# Patient Record
Sex: FEMALE | Race: WHITE | Employment: UNEMPLOYED | URBAN - METROPOLITAN AREA
[De-identification: names, ages, dates, MRNs, and addresses within clinical notes are randomized per-mention and may not be internally consistent; named-entity substitution may affect disease eponyms.]

---

## 2017-02-20 PROBLEM — Z34.90 PREGNANCY: Status: ACTIVE | Noted: 2017-02-20

## 2017-07-05 DIAGNOSIS — Z3A.30 30 WEEKS GESTATION OF PREGNANCY: ICD-10-CM

## 2017-07-05 LAB
GLUCOSE, 1HR PP: 141 MG/DL (ref 60–140)
HCT VFR BLD CALC: 40.9 % (ref 37–47)
HEMOGLOBIN: 13.6 G/DL (ref 12–16)

## 2017-07-12 DIAGNOSIS — Z34.90 PREGNANCY, UNSPECIFIED GESTATIONAL AGE: ICD-10-CM

## 2017-07-12 LAB
GLUCOSE FASTING: 81 MG/DL (ref 0–95)
GLUCOSE TOLERANCE TEST 1 HOUR: 133 MG/DL (ref 0–180)
GLUCOSE TOLERANCE TEST 2 HOUR: 101 MG/DL (ref 0–155)
GLUCOSE TOLERANCE TEST 3 HOUR: 82 MG/DL (ref 0–140)

## 2017-08-23 DIAGNOSIS — Z3A.35 35 WEEKS GESTATION OF PREGNANCY: ICD-10-CM

## 2017-08-25 LAB
GENITAL CULTURE, ROUTINE: ABNORMAL
GENITAL CULTURE, ROUTINE: ABNORMAL
ORGANISM: ABNORMAL

## 2017-09-05 ENCOUNTER — HOSPITAL ENCOUNTER (OUTPATIENT)
Age: 42
Discharge: HOME | End: 2017-09-05
Attending: OBSTETRICS & GYNECOLOGY | Admitting: OBSTETRICS & GYNECOLOGY
Payer: COMMERCIAL

## 2017-09-05 VITALS
TEMPERATURE: 98.1 F | SYSTOLIC BLOOD PRESSURE: 121 MMHG | RESPIRATION RATE: 18 BRPM | HEART RATE: 81 BPM | DIASTOLIC BLOOD PRESSURE: 73 MMHG

## 2017-09-05 PROCEDURE — 59025 FETAL NON-STRESS TEST: CPT

## 2017-09-18 ENCOUNTER — ANESTHESIA EVENT (OUTPATIENT)
Dept: LABOR AND DELIVERY | Age: 42
DRG: 540 | End: 2017-09-18
Payer: COMMERCIAL

## 2017-09-18 ENCOUNTER — HOSPITAL ENCOUNTER (INPATIENT)
Age: 42
LOS: 2 days | Discharge: HOME | DRG: 540 | End: 2017-09-20
Attending: OBSTETRICS & GYNECOLOGY | Admitting: OBSTETRICS & GYNECOLOGY
Payer: COMMERCIAL

## 2017-09-18 ENCOUNTER — ANESTHESIA (OUTPATIENT)
Dept: LABOR AND DELIVERY | Age: 42
DRG: 540 | End: 2017-09-18
Payer: COMMERCIAL

## 2017-09-18 VITALS — DIASTOLIC BLOOD PRESSURE: 57 MMHG | SYSTOLIC BLOOD PRESSURE: 108 MMHG | OXYGEN SATURATION: 97 %

## 2017-09-18 LAB
ABO/RH: NORMAL
ALBUMIN SERPL-MCNC: 3.3 G/DL (ref 3.9–4.9)
ALP BLD-CCNC: 125 U/L (ref 40–130)
ALT SERPL-CCNC: 15 U/L (ref 0–33)
AMPHETAMINE SCREEN, URINE: NORMAL
ANION GAP SERPL CALCULATED.3IONS-SCNC: 16 MEQ/L (ref 7–13)
ANTIBODY SCREEN: NORMAL
APTT: 27.3 SEC (ref 21.6–35.4)
AST SERPL-CCNC: 19 U/L (ref 0–35)
BARBITURATE SCREEN URINE: NORMAL
BASOPHILS ABSOLUTE: 0 K/UL (ref 0–0.2)
BASOPHILS RELATIVE PERCENT: 0.4 %
BENZODIAZEPINE SCREEN, URINE: NORMAL
BILIRUB SERPL-MCNC: 0.4 MG/DL (ref 0–1.2)
BILIRUBIN URINE: NEGATIVE
BLOOD, URINE: NEGATIVE
BUN BLDV-MCNC: 9 MG/DL (ref 6–20)
CALCIUM SERPL-MCNC: 8.5 MG/DL (ref 8.6–10.2)
CANNABINOID SCREEN URINE: NORMAL
CHLORIDE BLD-SCNC: 103 MEQ/L (ref 98–107)
CLARITY: CLEAR
CO2: 19 MEQ/L (ref 22–29)
COCAINE METABOLITE SCREEN URINE: NORMAL
COLOR: YELLOW
CREAT SERPL-MCNC: 0.42 MG/DL (ref 0.5–0.9)
EOSINOPHILS ABSOLUTE: 0 K/UL (ref 0–0.7)
EOSINOPHILS RELATIVE PERCENT: 0.2 %
GFR AFRICAN AMERICAN: >60
GFR NON-AFRICAN AMERICAN: >60
GLOBULIN: 2.7 G/DL (ref 2.3–3.5)
GLUCOSE BLD-MCNC: 80 MG/DL (ref 74–109)
GLUCOSE URINE: NEGATIVE MG/DL
HCT VFR BLD CALC: 43.5 % (ref 37–47)
HEMOGLOBIN: 14.6 G/DL (ref 12–16)
HEPATITIS B SURFACE ANTIGEN INTERPRETATION: NORMAL
INR BLD: 0.9
KETONES, URINE: 15 MG/DL
LEUKOCYTE ESTERASE, URINE: NEGATIVE
LYMPHOCYTES ABSOLUTE: 1.6 K/UL (ref 1–4.8)
LYMPHOCYTES RELATIVE PERCENT: 18.5 %
Lab: NORMAL
MCH RBC QN AUTO: 31.9 PG (ref 27–31.3)
MCHC RBC AUTO-ENTMCNC: 33.5 % (ref 33–37)
MCV RBC AUTO: 95.2 FL (ref 82–100)
MONOCYTES ABSOLUTE: 0.7 K/UL (ref 0.2–0.8)
MONOCYTES RELATIVE PERCENT: 7.9 %
NEUTROPHILS ABSOLUTE: 6.2 K/UL (ref 1.4–6.5)
NEUTROPHILS RELATIVE PERCENT: 73 %
NITRITE, URINE: NEGATIVE
OPIATE SCREEN URINE: NORMAL
PDW BLD-RTO: 13.4 % (ref 11.5–14.5)
PH UA: 6 (ref 5–9)
PHENCYCLIDINE SCREEN URINE: NORMAL
PLATELET # BLD: 147 K/UL (ref 130–400)
POTASSIUM SERPL-SCNC: 4 MEQ/L (ref 3.5–5.1)
PROTEIN UA: ABNORMAL MG/DL
PROTHROMBIN TIME: 9.6 SEC (ref 8.1–13.7)
RBC # BLD: 4.57 M/UL (ref 4.2–5.4)
SODIUM BLD-SCNC: 138 MEQ/L (ref 132–144)
SPECIFIC GRAVITY UA: 1.02 (ref 1–1.03)
TOTAL PROTEIN: 6 G/DL (ref 6.4–8.1)
UROBILINOGEN, URINE: 0.2 E.U./DL
WBC # BLD: 8.5 K/UL (ref 4.8–10.8)

## 2017-09-18 PROCEDURE — 2580000003 HC RX 258: Performed by: OBSTETRICS & GYNECOLOGY

## 2017-09-18 PROCEDURE — 6370000000 HC RX 637 (ALT 250 FOR IP): Performed by: OBSTETRICS & GYNECOLOGY

## 2017-09-18 PROCEDURE — 6360000002 HC RX W HCPCS: Performed by: NURSE ANESTHETIST, CERTIFIED REGISTERED

## 2017-09-18 PROCEDURE — 3700000001 HC ADD 15 MINUTES (ANESTHESIA): Performed by: OBSTETRICS & GYNECOLOGY

## 2017-09-18 PROCEDURE — 6360000002 HC RX W HCPCS: Performed by: OBSTETRICS & GYNECOLOGY

## 2017-09-18 PROCEDURE — 2500000003 HC RX 250 WO HCPCS: Performed by: OBSTETRICS & GYNECOLOGY

## 2017-09-18 PROCEDURE — 87340 HEPATITIS B SURFACE AG IA: CPT

## 2017-09-18 PROCEDURE — 59514 CESAREAN DELIVERY ONLY: CPT | Performed by: OBSTETRICS & GYNECOLOGY

## 2017-09-18 PROCEDURE — 2500000003 HC RX 250 WO HCPCS: Performed by: NURSE ANESTHETIST, CERTIFIED REGISTERED

## 2017-09-18 PROCEDURE — 6360000002 HC RX W HCPCS

## 2017-09-18 PROCEDURE — 80053 COMPREHEN METABOLIC PANEL: CPT

## 2017-09-18 PROCEDURE — 7100000000 HC PACU RECOVERY - FIRST 15 MIN: Performed by: OBSTETRICS & GYNECOLOGY

## 2017-09-18 PROCEDURE — 36415 COLL VENOUS BLD VENIPUNCTURE: CPT

## 2017-09-18 PROCEDURE — 85610 PROTHROMBIN TIME: CPT

## 2017-09-18 PROCEDURE — 86901 BLOOD TYPING SEROLOGIC RH(D): CPT

## 2017-09-18 PROCEDURE — 86850 RBC ANTIBODY SCREEN: CPT

## 2017-09-18 PROCEDURE — 81003 URINALYSIS AUTO W/O SCOPE: CPT

## 2017-09-18 PROCEDURE — 7100000001 HC PACU RECOVERY - ADDTL 15 MIN: Performed by: OBSTETRICS & GYNECOLOGY

## 2017-09-18 PROCEDURE — 85025 COMPLETE CBC W/AUTO DIFF WBC: CPT

## 2017-09-18 PROCEDURE — 86592 SYPHILIS TEST NON-TREP QUAL: CPT

## 2017-09-18 PROCEDURE — 80307 DRUG TEST PRSMV CHEM ANLYZR: CPT

## 2017-09-18 PROCEDURE — 4A1HXCZ MONITORING OF PRODUCTS OF CONCEPTION, CARDIAC RATE, EXTERNAL APPROACH: ICD-10-PCS | Performed by: OBSTETRICS & GYNECOLOGY

## 2017-09-18 PROCEDURE — S0028 INJECTION, FAMOTIDINE, 20 MG: HCPCS | Performed by: OBSTETRICS & GYNECOLOGY

## 2017-09-18 PROCEDURE — 3700000000 HC ANESTHESIA ATTENDED CARE: Performed by: OBSTETRICS & GYNECOLOGY

## 2017-09-18 PROCEDURE — 86900 BLOOD TYPING SEROLOGIC ABO: CPT

## 2017-09-18 PROCEDURE — 3609079900 HC CESAREAN SECTION: Performed by: OBSTETRICS & GYNECOLOGY

## 2017-09-18 PROCEDURE — 85730 THROMBOPLASTIN TIME PARTIAL: CPT

## 2017-09-18 PROCEDURE — 1220000000 HC SEMI PRIVATE OB R&B

## 2017-09-18 RX ORDER — LANOLIN 100 %
OINTMENT (GRAM) TOPICAL
Status: DISCONTINUED | OUTPATIENT
Start: 2017-09-18 | End: 2017-09-20 | Stop reason: HOSPADM

## 2017-09-18 RX ORDER — SODIUM CHLORIDE, SODIUM LACTATE, POTASSIUM CHLORIDE, CALCIUM CHLORIDE 600; 310; 30; 20 MG/100ML; MG/100ML; MG/100ML; MG/100ML
INJECTION, SOLUTION INTRAVENOUS CONTINUOUS
Status: DISCONTINUED | OUTPATIENT
Start: 2017-09-18 | End: 2017-09-18

## 2017-09-18 RX ORDER — SODIUM CHLORIDE 0.9 % (FLUSH) 0.9 %
10 SYRINGE (ML) INJECTION PRN
Status: DISCONTINUED | OUTPATIENT
Start: 2017-09-18 | End: 2017-09-20 | Stop reason: HOSPADM

## 2017-09-18 RX ORDER — DIPHENHYDRAMINE HCL 25 MG
25 TABLET ORAL EVERY 6 HOURS PRN
Status: DISCONTINUED | OUTPATIENT
Start: 2017-09-18 | End: 2017-09-18

## 2017-09-18 RX ORDER — OXYCODONE HYDROCHLORIDE AND ACETAMINOPHEN 5; 325 MG/1; MG/1
2 TABLET ORAL EVERY 4 HOURS PRN
Status: DISCONTINUED | OUTPATIENT
Start: 2017-09-18 | End: 2017-09-20 | Stop reason: HOSPADM

## 2017-09-18 RX ORDER — NALBUPHINE HCL 10 MG/ML
5 AMPUL (ML) INJECTION EVERY 4 HOURS PRN
Status: DISCONTINUED | OUTPATIENT
Start: 2017-09-18 | End: 2017-09-18

## 2017-09-18 RX ORDER — ONDANSETRON 2 MG/ML
4 INJECTION INTRAMUSCULAR; INTRAVENOUS EVERY 6 HOURS PRN
Status: DISCONTINUED | OUTPATIENT
Start: 2017-09-18 | End: 2017-09-18

## 2017-09-18 RX ORDER — BISACODYL 10 MG
10 SUPPOSITORY, RECTAL RECTAL DAILY PRN
Status: DISCONTINUED | OUTPATIENT
Start: 2017-09-18 | End: 2017-09-20 | Stop reason: HOSPADM

## 2017-09-18 RX ORDER — DIPHENHYDRAMINE HYDROCHLORIDE 50 MG/ML
25 INJECTION INTRAMUSCULAR; INTRAVENOUS EVERY 6 HOURS PRN
Status: DISCONTINUED | OUTPATIENT
Start: 2017-09-18 | End: 2017-09-18

## 2017-09-18 RX ORDER — ONDANSETRON 2 MG/ML
INJECTION INTRAMUSCULAR; INTRAVENOUS PRN
Status: DISCONTINUED | OUTPATIENT
Start: 2017-09-18 | End: 2017-09-18 | Stop reason: SDUPTHER

## 2017-09-18 RX ORDER — SCOLOPAMINE TRANSDERMAL SYSTEM 1 MG/1
1 PATCH, EXTENDED RELEASE TRANSDERMAL
Status: DISCONTINUED | OUTPATIENT
Start: 2017-09-18 | End: 2017-09-18

## 2017-09-18 RX ORDER — BUPIVACAINE HYDROCHLORIDE 7.5 MG/ML
INJECTION, SOLUTION INTRASPINAL PRN
Status: DISCONTINUED | OUTPATIENT
Start: 2017-09-18 | End: 2017-09-18 | Stop reason: SDUPTHER

## 2017-09-18 RX ORDER — MORPHINE SULFATE 1 MG/ML
INJECTION, SOLUTION EPIDURAL; INTRATHECAL; INTRAVENOUS PRN
Status: DISCONTINUED | OUTPATIENT
Start: 2017-09-18 | End: 2017-09-18 | Stop reason: SDUPTHER

## 2017-09-18 RX ORDER — ACETAMINOPHEN 325 MG/1
325 TABLET ORAL EVERY 4 HOURS PRN
Status: DISCONTINUED | OUTPATIENT
Start: 2017-09-18 | End: 2017-09-18

## 2017-09-18 RX ORDER — KETOROLAC TROMETHAMINE 30 MG/ML
INJECTION, SOLUTION INTRAMUSCULAR; INTRAVENOUS PRN
Status: DISCONTINUED | OUTPATIENT
Start: 2017-09-18 | End: 2017-09-18 | Stop reason: SDUPTHER

## 2017-09-18 RX ORDER — ONDANSETRON 2 MG/ML
4 INJECTION INTRAMUSCULAR; INTRAVENOUS EVERY 6 HOURS PRN
Status: DISCONTINUED | OUTPATIENT
Start: 2017-09-18 | End: 2017-09-20 | Stop reason: HOSPADM

## 2017-09-18 RX ORDER — METHYLERGONOVINE MALEATE 0.2 MG/ML
INJECTION INTRAVENOUS
Status: DISCONTINUED
Start: 2017-09-18 | End: 2017-09-18

## 2017-09-18 RX ORDER — SODIUM CHLORIDE, SODIUM LACTATE, POTASSIUM CHLORIDE, CALCIUM CHLORIDE 600; 310; 30; 20 MG/100ML; MG/100ML; MG/100ML; MG/100ML
INJECTION, SOLUTION INTRAVENOUS CONTINUOUS
Status: DISCONTINUED | OUTPATIENT
Start: 2017-09-18 | End: 2017-09-20 | Stop reason: HOSPADM

## 2017-09-18 RX ORDER — KETOROLAC TROMETHAMINE 30 MG/ML
30 INJECTION, SOLUTION INTRAMUSCULAR; INTRAVENOUS EVERY 6 HOURS
Status: DISPENSED | OUTPATIENT
Start: 2017-09-18 | End: 2017-09-20

## 2017-09-18 RX ORDER — OXYTOCIN 10 [USP'U]/ML
INJECTION, SOLUTION INTRAMUSCULAR; INTRAVENOUS PRN
Status: DISCONTINUED | OUTPATIENT
Start: 2017-09-18 | End: 2017-09-18 | Stop reason: SDUPTHER

## 2017-09-18 RX ORDER — OXYCODONE HYDROCHLORIDE AND ACETAMINOPHEN 5; 325 MG/1; MG/1
1 TABLET ORAL EVERY 4 HOURS PRN
Status: DISCONTINUED | OUTPATIENT
Start: 2017-09-18 | End: 2017-09-20 | Stop reason: HOSPADM

## 2017-09-18 RX ORDER — DEXAMETHASONE SODIUM PHOSPHATE 4 MG/ML
INJECTION, SOLUTION INTRA-ARTICULAR; INTRALESIONAL; INTRAMUSCULAR; INTRAVENOUS; SOFT TISSUE PRN
Status: DISCONTINUED | OUTPATIENT
Start: 2017-09-18 | End: 2017-09-18 | Stop reason: SDUPTHER

## 2017-09-18 RX ORDER — NALOXONE HYDROCHLORIDE 0.4 MG/ML
0.4 INJECTION, SOLUTION INTRAMUSCULAR; INTRAVENOUS; SUBCUTANEOUS PRN
Status: DISCONTINUED | OUTPATIENT
Start: 2017-09-18 | End: 2017-09-18

## 2017-09-18 RX ORDER — DOCUSATE SODIUM 100 MG/1
100 CAPSULE, LIQUID FILLED ORAL 2 TIMES DAILY
Status: DISCONTINUED | OUTPATIENT
Start: 2017-09-18 | End: 2017-09-20 | Stop reason: HOSPADM

## 2017-09-18 RX ORDER — KETOROLAC TROMETHAMINE 30 MG/ML
30 INJECTION, SOLUTION INTRAMUSCULAR; INTRAVENOUS EVERY 6 HOURS
Status: DISCONTINUED | OUTPATIENT
Start: 2017-09-18 | End: 2017-09-18

## 2017-09-18 RX ORDER — SODIUM CHLORIDE 0.9 % (FLUSH) 0.9 %
10 SYRINGE (ML) INJECTION EVERY 12 HOURS SCHEDULED
Status: DISCONTINUED | OUTPATIENT
Start: 2017-09-18 | End: 2017-09-20 | Stop reason: HOSPADM

## 2017-09-18 RX ORDER — SODIUM CHLORIDE 0.9 % (FLUSH) 0.9 %
10 SYRINGE (ML) INJECTION PRN
Status: DISCONTINUED | OUTPATIENT
Start: 2017-09-18 | End: 2017-09-18

## 2017-09-18 RX ORDER — IBUPROFEN 800 MG/1
800 TABLET ORAL EVERY 8 HOURS
Status: DISCONTINUED | OUTPATIENT
Start: 2017-09-18 | End: 2017-09-20 | Stop reason: HOSPADM

## 2017-09-18 RX ORDER — SODIUM CHLORIDE 0.9 % (FLUSH) 0.9 %
10 SYRINGE (ML) INJECTION EVERY 12 HOURS SCHEDULED
Status: DISCONTINUED | OUTPATIENT
Start: 2017-09-18 | End: 2017-09-18

## 2017-09-18 RX ORDER — ASCORBIC ACID 500 MG
2000 TABLET ORAL DAILY
COMMUNITY

## 2017-09-18 RX ADMIN — Medication: at 15:15

## 2017-09-18 RX ADMIN — OXYTOCIN 20 UNITS: 10 INJECTION INTRAVENOUS at 13:20

## 2017-09-18 RX ADMIN — CEFAZOLIN SODIUM 2 G: 2 SOLUTION INTRAVENOUS at 12:55

## 2017-09-18 RX ADMIN — KETOROLAC TROMETHAMINE 30 MG: 30 INJECTION, SOLUTION INTRAMUSCULAR; INTRAVENOUS at 13:29

## 2017-09-18 RX ADMIN — BUPIVACAINE HYDROCHLORIDE 1.6 ML: 7.5 INJECTION, SOLUTION INTRASPINAL at 13:05

## 2017-09-18 RX ADMIN — DOCUSATE SODIUM 100 MG: 100 CAPSULE, LIQUID FILLED ORAL at 20:01

## 2017-09-18 RX ADMIN — SODIUM CHLORIDE, POTASSIUM CHLORIDE, SODIUM LACTATE AND CALCIUM CHLORIDE: 600; 310; 30; 20 INJECTION, SOLUTION INTRAVENOUS at 11:52

## 2017-09-18 RX ADMIN — SODIUM CHLORIDE, POTASSIUM CHLORIDE, SODIUM LACTATE AND CALCIUM CHLORIDE: 600; 310; 30; 20 INJECTION, SOLUTION INTRAVENOUS at 11:05

## 2017-09-18 RX ADMIN — MORPHINE SULFATE 0.2 MG: 1 INJECTION EPIDURAL; INTRATHECAL; INTRAVENOUS at 13:05

## 2017-09-18 RX ADMIN — KETOROLAC TROMETHAMINE 30 MG: 30 INJECTION, SOLUTION INTRAMUSCULAR at 20:01

## 2017-09-18 RX ADMIN — METHYLERGONOVINE MALEATE: 0.2 INJECTION INTRAMUSCULAR; INTRAVENOUS at 13:28

## 2017-09-18 RX ADMIN — OXYTOCIN 20 UNITS: 10 INJECTION INTRAVENOUS at 13:40

## 2017-09-18 RX ADMIN — DEXAMETHASONE SODIUM PHOSPHATE 4 MG: 4 INJECTION INTRA-ARTICULAR; INTRALESIONAL; INTRAMUSCULAR; INTRAVENOUS; SOFT TISSUE at 13:23

## 2017-09-18 RX ADMIN — ONDANSETRON 4 MG: 2 INJECTION INTRAMUSCULAR; INTRAVENOUS at 13:22

## 2017-09-18 RX ADMIN — FAMOTIDINE 20 MG: 10 INJECTION INTRAVENOUS at 11:36

## 2017-09-18 RX ADMIN — SODIUM CHLORIDE, POTASSIUM CHLORIDE, SODIUM LACTATE AND CALCIUM CHLORIDE: 600; 310; 30; 20 INJECTION, SOLUTION INTRAVENOUS at 22:40

## 2017-09-18 ASSESSMENT — PAIN SCALES - GENERAL: PAINLEVEL_OUTOF10: 0

## 2017-09-19 LAB
HEMOGLOBIN: 12.5 G/DL (ref 12–16)
RPR: NORMAL

## 2017-09-19 PROCEDURE — 6370000000 HC RX 637 (ALT 250 FOR IP): Performed by: OBSTETRICS & GYNECOLOGY

## 2017-09-19 PROCEDURE — 6360000002 HC RX W HCPCS: Performed by: OBSTETRICS & GYNECOLOGY

## 2017-09-19 PROCEDURE — 1220000000 HC SEMI PRIVATE OB R&B

## 2017-09-19 PROCEDURE — 36415 COLL VENOUS BLD VENIPUNCTURE: CPT

## 2017-09-19 PROCEDURE — 2580000003 HC RX 258: Performed by: OBSTETRICS & GYNECOLOGY

## 2017-09-19 PROCEDURE — 85018 HEMOGLOBIN: CPT

## 2017-09-19 RX ORDER — BISACODYL 10 MG
10 SUPPOSITORY, RECTAL RECTAL DAILY PRN
Status: DISCONTINUED | OUTPATIENT
Start: 2017-09-19 | End: 2017-09-20 | Stop reason: HOSPADM

## 2017-09-19 RX ADMIN — OXYCODONE HYDROCHLORIDE AND ACETAMINOPHEN 1 TABLET: 5; 325 TABLET ORAL at 21:16

## 2017-09-19 RX ADMIN — OXYCODONE HYDROCHLORIDE AND ACETAMINOPHEN 1 TABLET: 5; 325 TABLET ORAL at 15:21

## 2017-09-19 RX ADMIN — SODIUM CHLORIDE, POTASSIUM CHLORIDE, SODIUM LACTATE AND CALCIUM CHLORIDE: 600; 310; 30; 20 INJECTION, SOLUTION INTRAVENOUS at 06:30

## 2017-09-19 RX ADMIN — Medication: at 21:16

## 2017-09-19 RX ADMIN — BISACODYL 10 MG: 10 SUPPOSITORY RECTAL at 09:12

## 2017-09-19 RX ADMIN — DOCUSATE SODIUM 100 MG: 100 CAPSULE, LIQUID FILLED ORAL at 09:07

## 2017-09-19 RX ADMIN — KETOROLAC TROMETHAMINE 30 MG: 30 INJECTION, SOLUTION INTRAMUSCULAR at 02:20

## 2017-09-19 RX ADMIN — IBUPROFEN 800 MG: 800 TABLET, FILM COATED ORAL at 21:17

## 2017-09-19 RX ADMIN — IBUPROFEN 800 MG: 800 TABLET, FILM COATED ORAL at 06:34

## 2017-09-19 RX ADMIN — DOCUSATE SODIUM 100 MG: 100 CAPSULE, LIQUID FILLED ORAL at 21:17

## 2017-09-19 RX ADMIN — IBUPROFEN 800 MG: 800 TABLET, FILM COATED ORAL at 14:33

## 2017-09-19 ASSESSMENT — PAIN SCALES - GENERAL
PAINLEVEL_OUTOF10: 5
PAINLEVEL_OUTOF10: 3
PAINLEVEL_OUTOF10: 7
PAINLEVEL_OUTOF10: 2
PAINLEVEL_OUTOF10: 5
PAINLEVEL_OUTOF10: 7
PAINLEVEL_OUTOF10: 6

## 2017-09-19 ASSESSMENT — PAIN DESCRIPTION - LOCATION
LOCATION: ABDOMEN
LOCATION: ABDOMEN

## 2017-09-19 ASSESSMENT — PAIN DESCRIPTION - PAIN TYPE
TYPE: SURGICAL PAIN
TYPE: SURGICAL PAIN

## 2017-09-20 VITALS
RESPIRATION RATE: 16 BRPM | TEMPERATURE: 97.8 F | OXYGEN SATURATION: 98 % | SYSTOLIC BLOOD PRESSURE: 114 MMHG | DIASTOLIC BLOOD PRESSURE: 72 MMHG | HEART RATE: 61 BPM | WEIGHT: 181.4 LBS

## 2017-09-20 PROCEDURE — 6370000000 HC RX 637 (ALT 250 FOR IP): Performed by: OBSTETRICS & GYNECOLOGY

## 2017-09-20 RX ORDER — IBUPROFEN 600 MG/1
600 TABLET ORAL EVERY 6 HOURS PRN
Qty: 60 TABLET | Refills: 3 | Status: SHIPPED | OUTPATIENT
Start: 2017-09-20

## 2017-09-20 RX ORDER — OXYCODONE HYDROCHLORIDE AND ACETAMINOPHEN 5; 325 MG/1; MG/1
1 TABLET ORAL EVERY 6 HOURS PRN
Qty: 30 TABLET | Refills: 0 | Status: SHIPPED | OUTPATIENT
Start: 2017-09-20 | End: 2017-09-27

## 2017-09-20 RX ADMIN — DOCUSATE SODIUM 100 MG: 100 CAPSULE, LIQUID FILLED ORAL at 09:24

## 2017-09-20 RX ADMIN — IBUPROFEN 800 MG: 800 TABLET, FILM COATED ORAL at 06:39

## 2017-09-20 RX ADMIN — OXYCODONE HYDROCHLORIDE AND ACETAMINOPHEN 1 TABLET: 5; 325 TABLET ORAL at 06:39

## 2017-09-20 ASSESSMENT — PAIN DESCRIPTION - LOCATION
LOCATION: ABDOMEN
LOCATION: ABDOMEN

## 2017-09-20 ASSESSMENT — PAIN SCALES - GENERAL
PAINLEVEL_OUTOF10: 2
PAINLEVEL_OUTOF10: 3
PAINLEVEL_OUTOF10: 4

## 2017-09-20 ASSESSMENT — PAIN DESCRIPTION - PAIN TYPE: TYPE: SURGICAL PAIN

## 2017-09-28 PROBLEM — Z34.90 PREGNANCY: Status: RESOLVED | Noted: 2017-02-20 | Resolved: 2017-09-28

## 2019-06-11 ENCOUNTER — OFFICE VISIT (OUTPATIENT)
Dept: OBGYN CLINIC | Age: 44
End: 2019-06-11
Payer: COMMERCIAL

## 2019-06-11 VITALS — DIASTOLIC BLOOD PRESSURE: 56 MMHG | WEIGHT: 146 LBS | SYSTOLIC BLOOD PRESSURE: 104 MMHG

## 2019-06-11 DIAGNOSIS — N89.8 VAGINAL ITCHING: Primary | ICD-10-CM

## 2019-06-11 DIAGNOSIS — B37.31 YEAST VAGINITIS: ICD-10-CM

## 2019-06-11 PROCEDURE — 1036F TOBACCO NON-USER: CPT | Performed by: OBSTETRICS & GYNECOLOGY

## 2019-06-11 PROCEDURE — 99213 OFFICE O/P EST LOW 20 MIN: CPT | Performed by: OBSTETRICS & GYNECOLOGY

## 2019-06-11 PROCEDURE — G8421 BMI NOT CALCULATED: HCPCS | Performed by: OBSTETRICS & GYNECOLOGY

## 2019-06-11 PROCEDURE — G8427 DOCREV CUR MEDS BY ELIG CLIN: HCPCS | Performed by: OBSTETRICS & GYNECOLOGY

## 2019-06-11 RX ORDER — CLOTRIMAZOLE AND BETAMETHASONE DIPROPIONATE 10; .64 MG/G; MG/G
CREAM TOPICAL
Qty: 30 G | Refills: 1 | Status: SHIPPED | OUTPATIENT
Start: 2019-06-11

## 2019-06-11 RX ORDER — FLUCONAZOLE 150 MG/1
TABLET ORAL
Qty: 3 TABLET | Refills: 1 | Status: SHIPPED | OUTPATIENT
Start: 2019-06-11

## 2019-06-11 NOTE — PROGRESS NOTES
Subjective:vaginal itching. No recent antibiotics      Patient ID: Lalo Silverman is a 38 y.o. female. HPI    Review of Systems    Objective:vulvar erythema.  Vagina irritated but no distinct discharge   Physical Exam    Assessment:    yeast vulvovaginitis      Plan:    diflucan 150/3   lotrisone  Fu annual     Nina Deras DO

## 2019-06-12 DIAGNOSIS — N89.8 VAGINAL ITCHING: ICD-10-CM

## 2019-06-12 LAB
CLUE CELLS: NORMAL
TRICHOMONAS PREP: NORMAL
TRICHOMONAS VAGINALIS SCREEN: NEGATIVE
YEAST WET PREP: NORMAL

## 2019-08-20 ENCOUNTER — OFFICE VISIT (OUTPATIENT)
Dept: OBGYN CLINIC | Age: 44
End: 2019-08-20
Payer: COMMERCIAL

## 2019-08-20 VITALS
DIASTOLIC BLOOD PRESSURE: 70 MMHG | SYSTOLIC BLOOD PRESSURE: 108 MMHG | BODY MASS INDEX: 23.73 KG/M2 | WEIGHT: 139 LBS | HEIGHT: 64 IN

## 2019-08-20 DIAGNOSIS — Z11.51 ENCOUNTER FOR SCREENING FOR HUMAN PAPILLOMAVIRUS (HPV): ICD-10-CM

## 2019-08-20 DIAGNOSIS — Z01.419 WOMEN'S ANNUAL ROUTINE GYNECOLOGICAL EXAMINATION: ICD-10-CM

## 2019-08-20 DIAGNOSIS — Z01.419 WOMEN'S ANNUAL ROUTINE GYNECOLOGICAL EXAMINATION: Primary | ICD-10-CM

## 2019-08-20 DIAGNOSIS — Z12.39 ENCOUNTER FOR SPECIAL SCREENING EXAMINATION FOR NEOPLASM OF BREAST: ICD-10-CM

## 2019-08-20 PROCEDURE — 99396 PREV VISIT EST AGE 40-64: CPT | Performed by: OBSTETRICS & GYNECOLOGY

## 2019-08-20 ASSESSMENT — ENCOUNTER SYMPTOMS
CHEST TIGHTNESS: 0
BACK PAIN: 0
SORE THROAT: 0
BLOOD IN STOOL: 0
VOICE CHANGE: 0
COLOR CHANGE: 0
COUGH: 0
ABDOMINAL PAIN: 0
WHEEZING: 0
CONSTIPATION: 0
SHORTNESS OF BREATH: 0
TROUBLE SWALLOWING: 0
ABDOMINAL DISTENTION: 0
VOMITING: 0
NAUSEA: 0

## 2019-08-20 NOTE — PROGRESS NOTES
CHIEF COMPLAINT: No complaints. Chief Complaint   Patient presents with    Annual Exam     pap/libra         HISTORY OF PRESENT ILLNESS:  38 y.o. female presents for her annual exam. She had no concernsor complaints today. Her menses is  regular. She denies breakthrough bleeding, pelvic pain, or abnormal discharge. Bowel and bladder function is normal. Her health overallhas been good.      Past Medical History:   Diagnosis Date    Anxiety      Past Surgical History:   Procedure Laterality Date     SECTION  2014     SECTION N/A 2017     SECTION performed by Rivera Mcdonald DO [facility]    WISDOM TOOTH EXTRACTION       Family History   Problem Relation Age of Onset    Other Brother     Cancer Paternal Uncle     Depression Maternal Grandfather     Heart Attack Paternal Grandfather      Social History     Socioeconomic History    Marital status:      Spouse name: Not on file    Number of children: Not on file    Years of education: Not on file    Highest education level: Not on file   Occupational History    Not on file   Social Needs    Financial resource strain: Not on file    Food insecurity:     Worry: Not on file     Inability: Not on file    Transportation needs:     Medical: Not on file     Non-medical: Not on file   Tobacco Use    Smoking status: Never Smoker    Smokeless tobacco: Never Used   Substance and Sexual Activity    Alcohol use: No    Drug use: No    Sexual activity: Yes     Partners: Male   Lifestyle    Physical activity:     Days per week: Not on file     Minutes per session: Not on file    Stress: Not on file   Relationships    Social connections:     Talks on phone: Not on file     Gets together: Not on file     Attends Gnosticist service: Not on file     Active member of club or organization: Not on file     Attends meetings of clubs or organizations: Not on file     Relationship status: Not on file    Intimate partner distention, abdominal pain, blood in stool, constipation, nausea and vomiting. Endocrine: Negative for cold intolerance, heat intolerance, polydipsia, polyphagia and polyuria. Genitourinary: Negative for difficulty urinating, dyspareunia, dysuria, flank pain, frequency, genital sores, hematuria, menstrual problem, pelvic pain, urgency, vaginal bleeding, vaginal discharge and vaginal pain. Musculoskeletal: Negative for arthralgias, back pain, joint swelling and myalgias. Skin: Negative for color change and rash. Allergic/Immunologic: Negative for environmental allergies, food allergies and immunocompromised state. Neurological: Negative for dizziness, seizures, syncope, speech difficulty, weakness, numbness and headaches. Hematological: Negative for adenopathy. Does not bruise/bleed easily. Psychiatric/Behavioral: Negative for agitation, behavioral problems, confusion, decreased concentration, dysphoric mood and suicidal ideas. The patient is not nervous/anxious and is not hyperactive. All other systems reviewed and are negative. PHYSICAL EXAM  /70   Ht 5' 4\" (1.626 m)   Wt 139 lb (63 kg)   LMP 08/05/2019 (Exact Date)   BMI 23.86 kg/m²      Physical Exam   Constitutional: She is oriented to person, place, and time. Vital signs are normal. She appears well-developed and well-nourished. HENT:   Head: Normocephalic and atraumatic. Eyes: Pupils are equal, round, and reactive to light. Neck: Normal range of motion. No tracheal deviation present. No thyromegaly present. Cardiovascular: Normal rate, regular rhythm and normal heart sounds. Pulmonary/Chest: Effort normal and breath sounds normal. She exhibits no tenderness. No breast tenderness or discharge. Abdominal: Soft. Bowel sounds are normal. She exhibits no distension and no mass. There is no tenderness. There is no rebound and no guarding. Hernia confirmed negative in the left inguinal area.    Genitourinary: Rectum

## 2019-09-19 ENCOUNTER — TELEPHONE (OUTPATIENT)
Dept: OBGYN CLINIC | Age: 44
End: 2019-09-19

## 2019-09-19 RX ORDER — ERGOCALCIFEROL (VITAMIN D2) 10 MCG
1 TABLET ORAL DAILY
Qty: 30 TABLET | Refills: 11 | Status: SHIPPED | OUTPATIENT
Start: 2019-09-19

## 2025-02-04 ENCOUNTER — APPOINTMENT (OUTPATIENT)
Dept: ORTHOPEDIC SURGERY | Facility: CLINIC | Age: 50
End: 2025-02-04
Payer: COMMERCIAL

## 2025-02-04 ENCOUNTER — HOSPITAL ENCOUNTER (OUTPATIENT)
Dept: RADIOLOGY | Facility: HOSPITAL | Age: 50
Discharge: HOME | End: 2025-02-04
Payer: COMMERCIAL

## 2025-02-04 DIAGNOSIS — M76.829 POSTERIOR TIBIAL TENDON DYSFUNCTION: Primary | ICD-10-CM

## 2025-02-04 DIAGNOSIS — M84.375A STRESS FRACTURE OF NAVICULAR BONE OF LEFT FOOT: ICD-10-CM

## 2025-02-04 DIAGNOSIS — M79.672 LEFT FOOT PAIN: ICD-10-CM

## 2025-02-04 PROCEDURE — 1036F TOBACCO NON-USER: CPT | Performed by: INTERNAL MEDICINE

## 2025-02-04 PROCEDURE — 73630 X-RAY EXAM OF FOOT: CPT | Mod: LEFT SIDE | Performed by: RADIOLOGY

## 2025-02-04 PROCEDURE — 99203 OFFICE O/P NEW LOW 30 MIN: CPT | Performed by: INTERNAL MEDICINE

## 2025-02-04 PROCEDURE — 73630 X-RAY EXAM OF FOOT: CPT | Mod: LT

## 2025-02-04 NOTE — PROGRESS NOTES
New Patient Visit    CC: No chief complaint on file.      HPI: Mone is a 50 y.o. female presents today for evaluation for chronic left foot injury sustained four months ago while running. She states that she has since stopped running because of the pain. She is here for initial evaluation.  She had ankle x-rays done at the [facility] which showed no obvious fractures.  Has now constant pain of the left foot.        Review of Systems   GENERAL: Negative for malaise, significant weight loss, fever  MUSCULOSKELETAL: See HPI  NEURO:  Negative for numbness / tingling     Past Medical History  Past Medical History:   Diagnosis Date    Personal history of other diseases of the nervous system and sense organs     History of eye problem       Medication review  Medication Documentation Review Audit    **Prior to Admission medications have not yet been reviewed**         Allergies  Not on File    Social History  Social History     Socioeconomic History    Marital status:      Spouse name: Not on file    Number of children: Not on file    Years of education: Not on file    Highest education level: Not on file   Occupational History    Not on file   Tobacco Use    Smoking status: Not on file    Smokeless tobacco: Not on file   Substance and Sexual Activity    Alcohol use: Not on file    Drug use: Not on file    Sexual activity: Not on file   Other Topics Concern    Not on file   Social History Narrative    Not on file     Social Drivers of Health     Financial Resource Strain: Not on file   Food Insecurity: Not on file   Transportation Needs: Not on file   Physical Activity: Not on file   Stress: Not on file   Social Connections: Not on file   Intimate Partner Violence: Not on file   Housing Stability: Not on file       Surgical History  Past Surgical History:   Procedure Laterality Date    OTHER SURGICAL HISTORY  2022    Knee surgery    OTHER SURGICAL HISTORY  2020     section        Physical Exam:  GENERAL:  Patient is awake, alert, and oriented to person place and time.  Patient appears well nourished and well kept.  Affect Calm, Not Acutely Distressed.  HEENT:  Normocephalic, Atraumatic, EOMI  CARDIOVASCULAR:  Hemodynamically stable.  RESPIRATORY:  Normal respirations with unlabored breathing.  Extremity: Left foot examination shows skin is intact.  There is no erythema or warmth.  No obvious deformity.  Pes planus of the left foot with clinical findings of posterior tibial tendon dysfunction with too many toes sign.  Pain over the navicular bone and the accessory bone.  Pain along the posterior tibial tendon, just posterior to the medial malleolus and along its insertion.  There is no clinical signs of infection.  There is no pain over the base of the fifth metatarsal bone.  There is no pain in the midfoot.  No pain over the metatarsal bones.  No pain of the cuboid bone.  There is no pain in the calcaneus.  There is no pain over the plantar aponeurosis.  There is no pain over the proximal phalanx of the left great toe.  No pain over distal phalanx of the left t great toe.  Neurovascularly intact.  Right foot was examined for comparison, pes planus of the right foot.      Diagnostics: X-rays reviewed  XR KNEE COMPLETE 4 OR MORE VIEWS  MRN: [id number]  Patient Name: ALFRED GARCIA     STUDY:  KNEE; COMPLT, 4 OR MORE VIEWS;  Left;  12/21/2021 9:25 am     INDICATION:  Pain  M25.569: Knee pain.     ACCESSION NUMBER(S):  [id number]     ORDERING CLINICIAN:  COLE BUDINSKY     FINDINGS:  Left knee films demonstrate stable appearing postoperative changes of  previous ACL reconstruction. No evidence of loose or intra-articular  body. No evidence for acute fracture or dislocation. No significant  osteoarthritic changes. No evidence of any additional osseous  abnormalities. Overall impression left knee status post ACL  reconstruction.         Procedure: None     Assessment:   Left posterior tibial  tendon dysfunction  Left foot os naviculare -possible navicular stress fracture    Plan: Mone presents today for initial evaluation for chronic left foot injury sustained four months ago while running.  Repeat x-rays reviewed.  Recommended a PTTD brace for support, which she declined today.  Will get involved some physical therapy for her posterior tibial tendons dysfunction and gave a prescription for custom orthotics for her pes planus and posterior tibial tendon dysfunction.  Recommend MRI of the left foot to evaluate for navicular stress fracture.  She will follow-up after the MRI and discuss treatment options pending MRI results, bracing and physical therapy.        No orders of the defined types were placed in this encounter.     At the conclusion of the visit there were no further questions by the patient/family regarding their plan of care.  Patient was instructed to call or return with any issues, questions, or concerns regarding their injury and/or treatment plan described above.     02/04/25 at 9:13 AM - Cristina Painter MD  Scribe Attestation  By signing my name below, I, Nathan Umang [place]   attest that this documentation has been prepared under the direction and in the presence of Cristina Painter MD.    Office: [phone]    This note was prepared using voice recognition software.  The details of this note are correct and have been reviewed, and corrected to the best of my ability.  Some grammatical errors may persist related to the Dragon software.

## 2025-02-11 ENCOUNTER — APPOINTMENT (OUTPATIENT)
Dept: PHYSICAL THERAPY | Facility: CLINIC | Age: 50
End: 2025-02-11
Payer: COMMERCIAL

## 2025-02-11 NOTE — PROGRESS NOTES
Physical Therapy    Physical Therapy Evaluation and Treatment      Patient Name: Mone Shah  MRN: [id number]  Today's Date: 2/11/2025    Time Entry:                           Insurance:  [email or web address]  30 V/Y  CALEB [employer] 30th  Visit: 1  POC: ***    Assessment:  *** y/o *** presents to outpatient physical therapy with reports of *** d/t ***. The patient presents with the current impairments of ***. These impairments currently limit their ability to ***. Due to the limitations listed above, the patient is currently at a decreased functional level compared to baseline, and they would benefit from skilled physical therapy to improve *** and facilitate a safe and efficient return to functional baseline. Patient's prognosis for improvement with therapy is *** at this time.        Plan:         Complexity:  ***    Current Problem:   No diagnosis found.    Subjective    ***    Pain intensity: ***/10 at this time, ***/10 at worst, ***/10 at best    Aggravating factors: ***    Relieving factors: ***    Patient goals: ***  General:     Precautions:     Pain:     Home Living:     Prior Level of Function:       Objective   Gait: ***   Functional squat: ***    Ankle AROM (* indicates pain)  DF R ***,L ***  PF R ***,L ***  Inv R ***,L ***  Ev R ***,L ***    Ankle PROM (*indicates pain)  DF R ***,L ***  PF R ***,L ***  Inv R ***,L ***  Ev R ***,L ***    Knee strength:  Flexion  R ***/5,L ***/5  Extension  R ***/5,L ***/5    Ankle Strength (*indicates pain)  DF R ***/5,L ***/5  PF R ***/5,L ***/5  Inv R ***/5,L ***/5  Ev R ***/5,L ***/5    Palpation: ***    Edema: ***    Dermatomes: ***    XR foot/ankle 2/4/25:   No fracture or dislocation is evident.  There is an os naviculare.  There is a small density to the medial side of the navicular bone  that is similar to the prior radiographs and may represent a small  accessory ossicle. There appears to be a degree of adductovarus of  the 5th toe as seen on these nonweightbearing  radiographs. There is a  small plantar calcaneal enthesophyte.. No ankle joint effusion is  evident. No soft tissue gas or radiopaque foreign body is evident.        Outcome Measures:  {PT Outcome Measures:98797}     Treatments:  {PT Treatments:31390}    EDUCATION:       Goals:

## 2025-02-20 ENCOUNTER — APPOINTMENT (OUTPATIENT)
Dept: PHYSICAL THERAPY | Facility: CLINIC | Age: 50
End: 2025-02-20
Payer: COMMERCIAL

## 2025-02-26 ENCOUNTER — EVALUATION (OUTPATIENT)
Dept: PHYSICAL THERAPY | Facility: CLINIC | Age: 50
End: 2025-02-26
Payer: COMMERCIAL

## 2025-02-26 DIAGNOSIS — M76.829 POSTERIOR TIBIAL TENDON DYSFUNCTION: ICD-10-CM

## 2025-02-26 PROCEDURE — 97110 THERAPEUTIC EXERCISES: CPT | Mod: GP | Performed by: PHYSICAL THERAPIST

## 2025-02-26 PROCEDURE — 97535 SELF CARE MNGMENT TRAINING: CPT | Mod: GP | Performed by: PHYSICAL THERAPIST

## 2025-02-26 PROCEDURE — 97161 PT EVAL LOW COMPLEX 20 MIN: CPT | Mod: GP | Performed by: PHYSICAL THERAPIST

## 2025-02-26 ASSESSMENT — PAIN DESCRIPTION - DESCRIPTORS: DESCRIPTORS: BURNING

## 2025-02-26 ASSESSMENT — PAIN SCALES - GENERAL: PAINLEVEL_OUTOF10: 0 - NO PAIN

## 2025-02-26 ASSESSMENT — PAIN - FUNCTIONAL ASSESSMENT: PAIN_FUNCTIONAL_ASSESSMENT: 0-10

## 2025-02-26 NOTE — PROGRESS NOTES
PT Initial Evaluation    Patient Name:  Mone Shah    MRN:  [id number]    :  1975    Today's Date:  25    Time Calculation  Start Time: 1040  Stop Time: 1120  Time Calculation (min): 40 min  PT Evaluation Time Entry  PT Evaluation (Low) Time Entry: 15  PT Therapeutic Procedures Time Entry  Therapeutic Exercise Time Entry: 8  Self-Care/Home Mgmt Training: 15       Informed Consent  Patient has been informed of all evaluation findings and treatment plans and agrees to participate in Physical Therapy services and plans as outlined.    Diagnosis:  Diagnosis and Precautions: M76.829 (ICD-10-CM) - Posterior tibial tendon dysfunction    Goals:   1. Strength and Endurance  Goal: Within 6 weeks, the patient’s calf and foot intrinsic muscle strength will improve from 4-/5 to 5/5 on Manual Muscle Testing, enabling them to perform 10 single-leg heel raises on the affected side with no more than 2/10 pain.  2. Neuromuscular Control, Proprioception, and Coordination  Goal: Within 4 weeks, the patient will demonstrate improved balance and foot/ankle stability by maintaining single-leg stance on the affected side for at least 10 seconds without significant compensations or loss of balance.  3. Work- or Sport-Specific Goals  Goal: Within 5-6 weeks, the patient will safely resume job-related tasks (e.g., prolonged standing or light lifting) or sport-specific drills (e.g., jogging, lateral movements) with proper foot/ankle mechanics and no more than 2/10 pain.  4. Education, Self-Management, and Compliance  Goal: By the second therapy visit, the patient will accurately demonstrate their prescribed foot/ankle home exercise program (HEP), explaining how to modify intensity based on pain level and logging adherence daily.  5. Palpation and Tissue Healing  Goal: Within 3 weeks, the patient will have decreased tenderness on palpation (e.g., along the lateral malleolus, Achilles tendon, or plantar fascia) from 3/10 to  1/10, indicating reduced inflammation and improved tissue tolerance.  6. Long-Term Maintenance and Prevention  Goal: By discharge (approximately 12 weeks), the patient will establish a consistent foot/ankle exercise routine (e.g., calf stretches, balance exercises) at least 3 times per week, with no significant flare-ups (>3/10 pain) during normal daily or recreational activities.     Plan of Care:      Treatment/Interventions: Cryotherapy, Education/ Instruction, Gait training, Manual therapy, Neuromuscular re-education, Self care/ home management, Taping techniques, Therapeutic activities, Therapeutic exercises, Ultrasound, Vasopneumatic device  PT Plan: Skilled PT  PT Frequency:  (next visit in 3 weeks, then 1-2x/week x 4 more visits)  Duration: 6 visits     Certification Period Start Date: 02/26/25  Certification Period End Date: 05/27/25  Number of Treatments Authorized: 6  Rehab Potential: Good  Plan of Care Agreement: Patient    PT Assessment:    Patient is a 50 y.o. FEMALE with c/o L foot pain.   Patient is alert and oriented x 3.  Patient presents with medical diagnosis of M76.829 (ICD-10-CM) - Posterior tibial tendon dysfunction  contributing to compensatory soft tissue dysfunction, pain, stiffness and weakness of the L foot.   Significant past medical history/past surgical history includes see below.    Skilled care is needed to progress the patient back to these activities without exacerbating symptoms.   Patient requires skilled PT services to address the problems identified and the individualized patient's goals as outlined in the problems and goals section of this evaluation.  A skilled PT is required to address these key impairments and to provide and progress with an appropriate home exercise program. Patient does not have any significant PMH influencing Rx and reports motivation to return to FUNCTIONAL ACTIVITY.   Patient demonstrates to be a good candidate for physical therapy with good rehab  potential and verbalized a good understanding of HER diagnosis, prognosis and treatment.  Goals have been established and reviewed with the patient.  Patient to have L ankle/foot MRI on 3/11/2025 and will be following up with MD afterwards.    PT Assessment Results: Decreased strength, Decreased endurance, Impaired balance, Pain  Rehab Prognosis: Good  Evaluation/Treatment Tolerance: Patient tolerated treatment well    Complexity:  Low complexity evaluation  due to a 15 minute duration, a past medical history WITHOUT any personal factors and/or comorbidities that could impact the POC, examination of body systems completed on one to two elements, the patient presents with a stable condition, and clinical decision making using the LEFS was of low complexity.     Prognosis:  Rehab Prognosis: Good    Problem List  Activity Limitations, Decreased Functional Level, Decreased knowledge of HEP, Flexibility, Strength, and Endurance    Impairments   IMPAIRED STRENGTH LOWER BODY, IMPAIRED BALANCE, INCREASED PAIN, IMPAIRED FUNCTIONAL ACTIVITY LEVEL, and IMPAIRED FUNCTIONAL MOVEMENT PATTERNS    Functional Limitations:  LIMITATIONS PERFORMING BASIC ADL'S, WORK ISSUES, PARTICIPATION IN HOBBIES, PARTICIPATION IN LEISURE ACTIVITIES, and PARTICIPATION IN HOME MANAGEMENT    General Visit Information:  Reason for Referral: PT Evaluation  Referred By: Dr. Cristina Painter  General Comment: M76.829 (ICD-10-CM) - Posterior tibial tendon dysfunction    Pre-Cautions:  ASHLEY Fall Risk Score (The score of 4 or more indicates an increased risk of falling): 0  LE Weight Bearing Status: Weight Bearing as Tolerated  Medical Precautions:  (L knee surgery 2020))     Reason for Visit:  PT Evaluate and Treat    Initial Evaluation:  Referred By: Dr. Cristina Paniter    Insurance  Insurance reviewed  Name of Insurance:  [facility]  Visit No.  1  * (EVAL) Posterior tibial tendon dysfunction M76.829; 0% COINSUR // 0 COPAY // 0 DED // 0 OOP // 30V CY //  PRIOR AUTH AFTER 30TH V [employer] [id number]     Subjective:    Current Episode  Date of Onset:  October 2024  Mechanism of injury:  started noticing increased L medial foot pain after running and has not ran since 10/2024  Chief Complaint:  L foot pain   Progression of symptoms:  same over time    Pain Score:  Pain Assessment: 0-10  Pain Assessment  Pain Assessment: 0-10  0-10 (Numeric) Pain Score: 0 - No pain (7/10 worst)  Pain Type: Chronic pain  Pain Location: Foot  Pain Orientation: Left  Pain Descriptors: Burning  Pain Frequency: Intermittent  Pain Onset: Ongoing  Pain Type: Chronic pain  Pain Location: Foot  Pain Orientation: Left  Pain Descriptors: Burning  Pain Frequency: Intermittent    Better with:  massage ball, meds    Worse with:  running, standing    Medical History/Surgical History:  Medical Precautions:  (L knee surgery 2020)    Reviewed medical history form with patient (medications/allergies reviewed with patient).  No current outpatient medications    Radiology:  Exam Information    Status Exam Begun Exam Ended   Final 2/04/2025 09:33 2/04/2025 09:35     Study Result    Narrative & Impression   Interpreted By:  Sergey Mae,   STUDY:  Left foot dated  2/4/2025.      INDICATION:  Signs/Symptoms:pain      COMPARISON:  Radiographs dated 07/08/2013.      ACCESSION NUMBER(S):  [id number]      ORDERING CLINICIAN:  SHELBI ROJAS      TECHNIQUE:  Three views of the left foot.      FINDINGS:  No fracture or dislocation is evident.  There is an os naviculare.  There is a small density to the medial side of the navicular bone  that is similar to the prior radiographs and may represent a small  accessory ossicle. There appears to be a degree of adductovarus of  the 5th toe as seen on these nonweightbearing radiographs. There is a  small plantar calcaneal enthesophyte.. No ankle joint effusion is  evident. No soft tissue gas or radiopaque foreign body is evident.      IMPRESSION:  As above without  osseous injury evident.     Functional Assessment:  Level of [place]:  Level of [place]: Independent with ADLs and functional transfers    Social History:    Occupation:  [employer]  Work Status:  EMPLOYED  Patient Awareness:  Patient is aware of HER diagnosis and prognosis.  Social Support/History:  LIVES WITH FAMILY    Objective:  Weightbearing Status:  WBAT    Skin:  skin intact over L foot/ankle    Palpation:   point tenderness over L navicular, posterior tibialis tendon    Sensation:  Patient denies numbness/tingling of bilateral LOWER extremities.    Gait:  Normal gait    Lower Extremity Movement Testing:  Squat- WNL's  Single leg stance  R- WNL's  L- limited and painful  Unilateral Heel Raise  R- WNL's  L- WNL's    ROM:  AROM  R knee AROM WNL's, L knee AROM WNL's, R hip AROM WNL's, L hip AROM WNL's, R ankle/foot AROM WNL's, and L ankle/foot AROM WNL's    Strength:    L ankle 4-/5 all planes  R knee 5/5 all planes, L knee 5/5 all planes, R hip 5/5 all planes, L hip 5/5 all planes, and R ankle/foot 5/5 all planes    Outcome measure  Lower Extremity Funtional Score (LEFS): 64/80     Treatment  Time in clinic started at  10:40am  Time in clinic ended at  11:20am  Total time in clinic is . 40 minutes  Total timed code time is  38 minutes    Treatment Performed Today:.   PT Initial Evaluation, Therapeutic Exercise, and Self-Care/Home Management HEP  Individual(s) Educated: Patient  Education Provided: Home Exercise Program  Diagnosis and Precautions: M76.829 (ICD-10-CM) - Posterior tibial tendon dysfunction  Risk and Benefits Discussed with Patient/Caregiver/Other: yes  Patient/Caregiver Demonstrated Understanding: yes  Plan of Care Discussed and Agreed Upon: yes  Patient Response to Education: Patient/Caregiver Verbalized Understanding of Information, Patient/Caregiver Performed Return Demonstration of Exercises/Activities, Patient/Caregiver Asked Appropriate Questions    Patient instructed in a home exercise  program, has been given handouts for each of the exercises performed and was given another sheet instructing patient in the amount of reps to perform and the adelina to follow while doing the exercises     Access Code: [email or web address]  URL: [email or web address]  Date: 02/26/2025  Prepared by: David Hedrick    Exercises  - Toe Yoga - Alternating Great Toe and Lesser Toe Extension  - 1 x daily - 3-4 x weekly - 2 sets - 10 reps - 5-10 hold  - Toe Spreading  - 1 x daily - 3-4 x weekly - 2 sets - 10 reps - 5-10 hold  - Arch Lifting  - 1 x daily - 3-4 x weekly - 2 sets - 10 reps - 5-10 hold  - Towel Scrunches  - 1 x daily - 3-4 x weekly - 2 sets - 10 reps - 5-10 hold  - Standing Eccentric Heel Raise  - 1 x daily - 3-4 x weekly - 2 sets - 10 reps - 5-10 hold  - Ankle Inversion with Resistance  - 1 x daily - 3-4 x weekly - 3 sets - 10 reps - 5-10 hold  - Ankle Inversion with Anchored Resistance at Table  - 1 x daily - 3-4 x weekly - 3 sets - 10 reps - 5-10 hold  - Seated Ankle Plantarflexion with Resistance  - 1 x daily - 3-4 x weekly - 3 sets - 10 reps - 5-10 hold  - Toe Walking  - 1 x daily - 3-4 x weekly - 1 sets - 10 reps - 5-10 hold  - Single Leg Balance with Forward Lean  - 1 x daily - 3-4 x weekly - 2 sets - 10 reps - 5-10 hold

## 2025-02-26 NOTE — LETTER
February 26, 2025    David Hedrick, PT  [address] [place], [address] [place] [address]    Patient: Mone Shah   YOB: 1975   Date of Visit: 2/26/2025       Dear Cristina Painter MD  [address]   [facility], [address] [place] [address],  [place] [address]    The attached plan of care is being sent to you because your patient’s medical reimbursement requires that you certify the plan of care. Your signature is required to allow uninterrupted insurance coverage.      You may indicate your approval by signing below and faxing this form back to us at Dept Fax: [phone].    Please call Dept: [phone] with any questions or concerns.    Thank you for this referral,        David Hedrick, PT  [place] [address] [facility]  [address] [facility] [address]    Payer: Payor: [employer] / Plan: [employer] / Product Type: *No Product type* /                                                                         Date:     Dear David Hedrick, PT,     Re: Ms. Mone Shah, MRN:[id number]    I certify that I have reviewed the attached plan of care and it is medically necessary for Ms. Mone Shah (1975) who is under my care.          ______________________________________                    _________________  Provider name and credentials                                           Date and time                                                                                           Plan of Care 2/26/25   Effective from: 2/26/2025  Effective to: 5/27/2025    Plan ID: [id number]            Participants as of Finalize on 2/26/2025    Name Type Comments Contact Info    Cristina Painter MD Referring Provider  [phone]    David Hedrick PT Physical Therapist  [phone]       Last Plan Note     Author: David Hedrick PT Status: Incomplete Last edited: 2/26/2025 10:45 AM       PT Initial  Evaluation    Patient Name:  Mone Shah    MRN:  [id number]    :  1975    Today's Date:  25    Time Calculation  Start Time: 1040  Stop Time: 1120  Time Calculation (min): 40 min  PT Evaluation Time Entry  PT Evaluation (Low) Time Entry: 15  PT Therapeutic Procedures Time Entry  Therapeutic Exercise Time Entry: 8  Self-Care/Home Mgmt Training: 15       Informed Consent  Patient has been informed of all evaluation findings and treatment plans and agrees to participate in Physical Therapy services and plans as outlined.    Diagnosis:  Diagnosis and Precautions: M76.829 (ICD-10-CM) - Posterior tibial tendon dysfunction    Goals:   1. Strength and Endurance  Goal: Within 6 weeks, the patient’s calf and foot intrinsic muscle strength will improve from 4-/5 to 5/5 on Manual Muscle Testing, enabling them to perform 10 single-leg heel raises on the affected side with no more than 2/10 pain.  2. Neuromuscular Control, Proprioception, and Coordination  Goal: Within 4 weeks, the patient will demonstrate improved balance and foot/ankle stability by maintaining single-leg stance on the affected side for at least 10 seconds without significant compensations or loss of balance.  3. Work- or Sport-Specific Goals  Goal: Within 5-6 weeks, the patient will safely resume job-related tasks (e.g., prolonged standing or light lifting) or sport-specific drills (e.g., jogging, lateral movements) with proper foot/ankle mechanics and no more than 2/10 pain.  4. Education, Self-Management, and Compliance  Goal: By the second therapy visit, the patient will accurately demonstrate their prescribed foot/ankle home exercise program (HEP), explaining how to modify intensity based on pain level and logging adherence daily.  5. Palpation and Tissue Healing  Goal: Within 3 weeks, the patient will have decreased tenderness on palpation (e.g., along the lateral malleolus, Achilles tendon, or plantar fascia) from 3/10 to /10,  indicating reduced inflammation and improved tissue tolerance.  6. Long-Term Maintenance and Prevention  Goal: By discharge (approximately 12 weeks), the patient will establish a consistent foot/ankle exercise routine (e.g., calf stretches, balance exercises) at least 3 times per week, with no significant flare-ups (>3/10 pain) during normal daily or recreational activities.     Plan of Care:      Treatment/Interventions: Cryotherapy, Education/ Instruction, Gait training, Manual therapy, Neuromuscular re-education, Self care/ home management, Taping techniques, Therapeutic activities, Therapeutic exercises, Ultrasound, Vasopneumatic device  PT Plan: Skilled PT  PT Frequency:  (next visit in 3 weeks, then 1-2x/week x 4 more visits)  Duration: 6 visits     Certification Period Start Date: 02/26/25  Certification Period End Date: 05/27/25  Number of Treatments Authorized: 6  Rehab Potential: Good  Plan of Care Agreement: Patient    PT Assessment:    Patient is a 50 y.o. FEMALE with c/o L foot pain.   Patient is alert and oriented x 3.  Patient presents with medical diagnosis of M76.829 (ICD-10-CM) - Posterior tibial tendon dysfunction  contributing to compensatory soft tissue dysfunction, pain, stiffness and weakness of the L foot.   Significant past medical history/past surgical history includes see below.    Skilled care is needed to progress the patient back to these activities without exacerbating symptoms.   Patient requires skilled PT services to address the problems identified and the individualized patient's goals as outlined in the problems and goals section of this evaluation.  A skilled PT is required to address these key impairments and to provide and progress with an appropriate home exercise program. Patient does not have any significant PMH influencing Rx and reports motivation to return to FUNCTIONAL ACTIVITY.   Patient demonstrates to be a good candidate for physical therapy with good rehab potential  and verbalized a good understanding of HER diagnosis, prognosis and treatment.  Goals have been established and reviewed with the patient.  Patient to have L ankle/foot MRI on 3/11/2025 and will be following up with MD afterwards.    PT Assessment Results: Decreased strength, Decreased endurance, Impaired balance, Pain  Rehab Prognosis: Good  Evaluation/Treatment Tolerance: Patient tolerated treatment well    Complexity:  Low complexity evaluation  due to a 15 minute duration, a past medical history WITHOUT any personal factors and/or comorbidities that could impact the POC, examination of body systems completed on one to two elements, the patient presents with a stable condition, and clinical decision making using the LEFS was of low complexity.     Prognosis:  Rehab Prognosis: Good    Problem List  Activity Limitations, Decreased Functional Level, Decreased knowledge of HEP, Flexibility, Strength, and Endurance    Impairments   IMPAIRED STRENGTH LOWER BODY, IMPAIRED BALANCE, INCREASED PAIN, IMPAIRED FUNCTIONAL ACTIVITY LEVEL, and IMPAIRED FUNCTIONAL MOVEMENT PATTERNS    Functional Limitations:  LIMITATIONS PERFORMING BASIC ADL'S, WORK ISSUES, PARTICIPATION IN HOBBIES, PARTICIPATION IN LEISURE ACTIVITIES, and PARTICIPATION IN HOME MANAGEMENT    General Visit Information:  Reason for Referral: PT Evaluation  Referred By: Dr. Cristina Painter  General Comment: M76.829 (ICD-10-CM) - Posterior tibial tendon dysfunction    Pre-Cautions:  FRANKYADI Fall Risk Score (The score of 4 or more indicates an increased risk of falling): 0  LE Weight Bearing Status: Weight Bearing as Tolerated  Medical Precautions:  (L knee surgery 2020))     Reason for Visit:  PT Evaluate and Treat    Initial Evaluation:  Referred By: Dr. Cristina Painter    Insurance  Insurance reviewed  Name of Insurance:  [facility]  Visit No.  1  * (EVAL) Posterior tibial tendon dysfunction M76.829; 0% COINSUR // 0 COPAY // 0 DED // 0 OOP // 30V CY // PRIOR AUTH  AFTER 30TH V [email or web address] [id number]     Subjective:    Current Episode  Date of Onset:  October 2024  Mechanism of injury:  started noticing increased L medial foot pain after running and has not ran since 10/2024  Chief Complaint:  L foot pain   Progression of symptoms:  same over time    Pain Score:  Pain Assessment: 0-10  Pain Assessment  Pain Assessment: 0-10  0-10 (Numeric) Pain Score: 0 - No pain (7/10 worst)  Pain Type: Chronic pain  Pain Location: Foot  Pain Orientation: Left  Pain Descriptors: Burning  Pain Frequency: Intermittent  Pain Onset: Ongoing  Pain Type: Chronic pain  Pain Location: Foot  Pain Orientation: Left  Pain Descriptors: Burning  Pain Frequency: Intermittent    Better with:  massage ball, meds    Worse with:  running, standing    Medical History/Surgical History:  Medical Precautions:  (L knee surgery 2020)    Reviewed medical history form with patient (medications/allergies reviewed with patient).  No current outpatient medications    Radiology:  Exam Information    Status Exam Begun Exam Ended   Final 2/04/2025 09:33 2/04/2025 09:35     Study Result    Narrative & Impression   Interpreted By:  Sergey Mae,   STUDY:  Left foot dated  2/4/2025.      INDICATION:  Signs/Symptoms:pain      COMPARISON:  Radiographs dated 07/08/2013.      ACCESSION NUMBER(S):  [id number]      ORDERING CLINICIAN:  SHELBI ROJAS      TECHNIQUE:  Three views of the left foot.      FINDINGS:  No fracture or dislocation is evident.  There is an os naviculare.  There is a small density to the medial side of the navicular bone  that is similar to the prior radiographs and may represent a small  accessory ossicle. There appears to be a degree of adductovarus of  the 5th toe as seen on these nonweightbearing radiographs. There is a  small plantar calcaneal enthesophyte.. No ankle joint effusion is  evident. No soft tissue gas or radiopaque foreign body is evident.      IMPRESSION:  As above without osseous  injury evident.     Functional Assessment:  Level of Silas:  Level of Silas: Independent with ADLs and functional transfers    Social History:    Occupation:  [employer]  Work Status:  EMPLOYED  Patient Awareness:  Patient is aware of HER diagnosis and prognosis.  Social Support/History:  LIVES WITH FAMILY    Objective:  Weightbearing Status:  WBAT    Skin:  skin intact over L foot/ankle    Palpation:   point tenderness over L navicular, posterior tibialis tendon    Sensation:  Patient denies numbness/tingling of bilateral LOWER extremities.    Gait:  Normal gait    Lower Extremity Movement Testing:  Squat- WNL's  Single leg stance  R- WNL's  L- limited and painful  Unilateral Heel Raise  R- WNL's  L- WNL's    ROM:  AROM  R knee AROM WNL's, L knee AROM WNL's, R hip AROM WNL's, L hip AROM WNL's, R ankle/foot AROM WNL's, and L ankle/foot AROM WNL's    Strength:    L ankle 4-/5 all planes  R knee 5/5 all planes, L knee 5/5 all planes, R hip 5/5 all planes, L hip 5/5 all planes, and R ankle/foot 5/5 all planes    Outcome measure  Lower Extremity Funtional Score (LEFS): 64/80     Treatment  Time in clinic started at  10:40am  Time in clinic ended at  11:20am  Total time in clinic is . 40 minutes  Total timed code time is  38 minutes    Treatment Performed Today:.   PT Initial Evaluation, Therapeutic Exercise, and Self-Care/Home Management HEP  Individual(s) Educated: Patient  Education Provided: Home Exercise Program  Diagnosis and Precautions: M76.829 (ICD-10-CM) - Posterior tibial tendon dysfunction  Risk and Benefits Discussed with Patient/Caregiver/Other: yes  Patient/Caregiver Demonstrated Understanding: yes  Plan of Care Discussed and Agreed Upon: yes  Patient Response to Education: Patient/Caregiver Verbalized Understanding of Information, Patient/Caregiver Performed Return Demonstration of Exercises/Activities, Patient/Caregiver Asked Appropriate Questions    Patient instructed in a home exercise program,  has been given handouts for each of the exercises performed and was given another sheet instructing patient in the amount of reps to perform and the adelina to follow while doing the exercises     Access Code: [email or web address]  URL: [email or web address]  Date: 02/26/2025  Prepared by: David Hedrick    Exercises  - Toe Yoga - Alternating Great Toe and Lesser Toe Extension  - 1 x daily - 3-4 x weekly - 2 sets - 10 reps - 5-10 hold  - Toe Spreading  - 1 x daily - 3-4 x weekly - 2 sets - 10 reps - 5-10 hold  - Arch Lifting  - 1 x daily - 3-4 x weekly - 2 sets - 10 reps - 5-10 hold  - Towel Scrunches  - 1 x daily - 3-4 x weekly - 2 sets - 10 reps - 5-10 hold  - Standing Eccentric Heel Raise  - 1 x daily - 3-4 x weekly - 2 sets - 10 reps - 5-10 hold  - Ankle Inversion with Resistance  - 1 x daily - 3-4 x weekly - 3 sets - 10 reps - 5-10 hold  - Ankle Inversion with Anchored Resistance at Table  - 1 x daily - 3-4 x weekly - 3 sets - 10 reps - 5-10 hold  - Seated Ankle Plantarflexion with Resistance  - 1 x daily - 3-4 x weekly - 3 sets - 10 reps - 5-10 hold  - Toe Walking  - 1 x daily - 3-4 x weekly - 1 sets - 10 reps - 5-10 hold  - Single Leg Balance with Forward Lean  - 1 x daily - 3-4 x weekly - 2 sets - 10 reps - 5-10 hold         Current Participants as of 2/26/2025    Name Type Comments Contact Info    Cristina Painter MD Referring Provider  [phone]    Signature pending    David Hedrick, PT Physical Therapist  [phone]    Signature pending

## 2025-02-27 ENCOUNTER — APPOINTMENT (OUTPATIENT)
Dept: RADIOLOGY | Facility: CLINIC | Age: 50
End: 2025-02-27
Payer: COMMERCIAL

## 2025-03-04 ENCOUNTER — APPOINTMENT (OUTPATIENT)
Dept: PHYSICAL THERAPY | Facility: CLINIC | Age: 50
End: 2025-03-04
Payer: COMMERCIAL

## 2025-03-11 ENCOUNTER — ANCILLARY PROCEDURE (OUTPATIENT)
Facility: HOSPITAL | Age: 50
End: 2025-03-11
Payer: COMMERCIAL

## 2025-03-11 ENCOUNTER — APPOINTMENT (OUTPATIENT)
Dept: PHYSICAL THERAPY | Facility: CLINIC | Age: 50
End: 2025-03-11
Payer: COMMERCIAL

## 2025-03-11 DIAGNOSIS — M84.375A STRESS FRACTURE OF NAVICULAR BONE OF LEFT FOOT: ICD-10-CM

## 2025-03-11 PROCEDURE — 73718 MRI LOWER EXTREMITY W/O DYE: CPT | Mod: LT

## 2025-03-11 PROCEDURE — 73718 MRI LOWER EXTREMITY W/O DYE: CPT | Mod: LEFT SIDE | Performed by: RADIOLOGY

## 2025-03-18 ENCOUNTER — APPOINTMENT (OUTPATIENT)
Dept: PHYSICAL THERAPY | Facility: CLINIC | Age: 50
End: 2025-03-18
Payer: COMMERCIAL

## 2025-03-18 DIAGNOSIS — M76.829 POSTERIOR TIBIAL TENDON DYSFUNCTION: ICD-10-CM

## 2025-03-27 ENCOUNTER — APPOINTMENT (OUTPATIENT)
Dept: ORTHOPEDIC SURGERY | Facility: CLINIC | Age: 50
End: 2025-03-27
Payer: COMMERCIAL

## 2025-04-04 ENCOUNTER — APPOINTMENT (OUTPATIENT)
Dept: ORTHOPEDIC SURGERY | Facility: CLINIC | Age: 50
End: 2025-04-04
Payer: COMMERCIAL

## 2025-04-17 ENCOUNTER — OFFICE VISIT (OUTPATIENT)
Dept: ORTHOPEDIC SURGERY | Facility: CLINIC | Age: 50
End: 2025-04-17
Payer: COMMERCIAL

## 2025-04-17 DIAGNOSIS — M76.821 POSTERIOR TIBIALIS TENDINITIS OF BOTH LOWER EXTREMITIES: ICD-10-CM

## 2025-04-17 DIAGNOSIS — M76.822 POSTERIOR TIBIALIS TENDINITIS OF BOTH LOWER EXTREMITIES: ICD-10-CM

## 2025-04-17 DIAGNOSIS — M76.829 POSTERIOR TIBIAL TENDON DYSFUNCTION: Primary | ICD-10-CM

## 2025-04-17 PROCEDURE — 99213 OFFICE O/P EST LOW 20 MIN: CPT | Performed by: INTERNAL MEDICINE

## 2025-04-17 NOTE — PROGRESS NOTES
CC:   No chief complaint on file.      HPI: Mone is a 50 y.o. female presents today for follow-up for MRI review for the left foot.  She did obtain her custom orthotics which she received a week ago.  She is feeling better with her new inserts and new shoes.        Review of Systems   GENERAL: Negative for malaise, significant weight loss, fever  MUSCULOSKELETAL: See HPI  NEURO:  Negative for numbness / tingling     Past Medical History  Medical History[1]    Medication review  Medication Documentation Review Audit       Reviewed by Meghan Bustamante MA (Medical Assistant) on 02/04/25 at 0919      Medication Order Taking? Sig Documenting Provider Last Dose Status            No Medications to Display                                   Allergies  RX Allergies[2]    Social History  Social History     Socioeconomic History    Marital status:      Spouse name: Not on file    Number of children: Not on file    Years of education: Not on file    Highest education level: Not on file   Occupational History    Not on file   Tobacco Use    Smoking status: Never    Smokeless tobacco: Never   Substance and Sexual Activity    Alcohol use: Defer    Drug use: Never    Sexual activity: Not on file   Other Topics Concern    Not on file   Social History Narrative    Not on file     Social Drivers of Health     Financial Resource Strain: Not on file   Food Insecurity: Not on file   Transportation Needs: Not on file   Physical Activity: Not on file   Stress: Not on file   Social Connections: Not on file   Intimate Partner Violence: Not on file   Housing Stability: Not on file       Surgical History  Surgical History[3]    Physical Exam:  GENERAL:  Patient is awake, alert, and oriented to person place and time.  Patient appears well nourished and well kept.  Affect Calm, Not Acutely Distressed.  HEENT:  Normocephalic, Atraumatic, EOMI  CARDIOVASCULAR:  Hemodynamically stable.  RESPIRATORY:  Normal respirations with unlabored  breathing.  Extremity:  Left foot examination shows skin is intact.  There is no erythema or warmth.  No obvious deformity.  Pes planus of the left foot with clinical findings of posterior tibial tendon dysfunction with too many toes sign.  No pain over the navicular bone and the accessory bone.  Mild pain along the posterior tibial tendon, just posterior to the medial malleolus and along its insertion.  There is no clinical signs of infection.  There is no pain over the base of the fifth metatarsal bone.  There is no pain in the midfoot.  No pain over the metatarsal bones.  No pain of the cuboid bone.  There is no pain in the calcaneus.  There is no pain over the plantar aponeurosis.  There is no pain over the proximal phalanx of the left great toe.  No pain over distal phalanx of the left t great toe.  Neurovascularly intact.  Right foot was examined for comparison, pes planus of the right foot.       Diagnostics: MRI reviewed  MR foot left wo IV contrast  Narrative: Interpreted By:  Andres Cassidy and Hofer Lindsay   STUDY:  MRI of the left foot without IV contrast;  3/11/2025 10:40 am      INDICATION:  Signs/Symptoms:pain. 50-year-old female with chronic left foot pain,  which started while running. ,M84.375A Stress fracture, left foot,  initial encounter for fracture      COMPARISON:  Left foot radiograph 02/04/2025      ACCESSION NUMBER(S):  [id number]      ORDERING CLINICIAN:  SHELBI ROJAS      TECHNIQUE:  MR imaging of the  left foot was obtained  without administration of  intravenous contrast medium.      FINDINGS:  TENDONS:  The extensor tendons are intact and demonstrate a normal course.  There is thickening with increased intrasubstance signal involving  the posterior tibialis tendon. There is moderate amount of  surrounding fluid about the posterior tibialis tendon as well. The  remaining flexor tendons are intact and demonstrate a normal course.  The peroneal tendons are intact and demonstrate  a normal course. The  Achilles tendon is intact. The plantar aponeurosis is intact.      LIGAMENTS:  The anterior talofibular, calcaneofibular, and posterior talofibular  ligaments are intact. The anterior and posterior tibiofibular  ligaments are intact. The deep and superficial components of the  deltoid ligament are intact. The spring ligament is intact.      JOINTS:  There is no dislocation. There is no joint effusion. The articular  cartilage of the ankle joint is normal. There is no osteochondral  defect in the talar dome.      OSSEOUS STRUCTURES:  The bone marrow signal is normal. There is no fracture or contusion.  There is no marrow replacing lesion. Note is made of a prominent  anterior process of the calcaneus. No evidence of bone marrow edema  or osseous bridging to suggest coalition.      SOFT TISSUES:  There is no muscle atrophy or tear.  The tarsal tunnel is normal.  The sinus tarsi is normal with preservation of fat signal.      Impression: Moderate tenosynovitis and tendinopathy of the posterior tibialis  tendon.      I personally reviewed the images/study and resident's interpretation  and I agree with the findings as stated by Mary Carmen Dailey MD (resident  radiologist). This study was analyzed and interpreted at [place], [place].      MACRO:  None      Signed by: Andres Cassidy 3/11/2025 2:26 PM  Dictation workstation:   [id number]        Procedure: None    Assessment:   1.  Left posterior tibial tendonitis/dysfunction  2.  Pes planus    Plan: Mone presents today for follow-up for MRI review of the left foot. We discussed the MRI review in detail today.  She did receive her custom orthotics which does help with her pain.  We recommended physical therapy for posterior tibial tendinitis/dysfunction.  Good prognosis, she will follow-up as needed.      No orders of the defined types were placed in this encounter.     At the conclusion of the visit  there were no further questions by the patient/family regarding their plan of care.  Patient was instructed to call or return with any issues, questions, or concerns regarding their injury and/or treatment plan described above.     25 at 12:54 PM - Cristina Painter MD  Scribe Attestation  By signing my name below, I, Nathan Heck, Scribe   attest that this documentation has been prepared under the direction and in the presence of Cristina Painter MD.    Office: [phone]    This note was prepared using voice recognition software.  The details of this note are correct and have been reviewed, and corrected to the best of my ability.  Some grammatical errors may persist related to the Dragon software.         [1]   Past Medical History:  Diagnosis Date    Personal history of other diseases of the nervous system and sense organs     History of eye problem   [2]   Allergies  Allergen Reactions    Penicillins Rash   [3]   Past Surgical History:  Procedure Laterality Date    OTHER SURGICAL HISTORY  2022    Knee surgery    OTHER SURGICAL HISTORY  2020     section

## 2025-05-07 ENCOUNTER — PATIENT OUTREACH (OUTPATIENT)
Dept: PRIMARY CARE | Facility: CLINIC | Age: 50
End: 2025-05-07
Payer: COMMERCIAL

## 2025-05-07 DIAGNOSIS — Z12.12 SCREENING FOR COLORECTAL CANCER: ICD-10-CM

## 2025-05-07 DIAGNOSIS — Z12.11 SCREENING FOR COLORECTAL CANCER: ICD-10-CM

## 2025-06-17 LAB — NONINV COLON CA DNA+OCC BLD SCRN STL QL: NEGATIVE

## 2025-07-31 ENCOUNTER — DOCUMENTATION (OUTPATIENT)
Dept: PHYSICAL THERAPY | Facility: CLINIC | Age: 50
End: 2025-07-31
Payer: COMMERCIAL

## 2025-07-31 DIAGNOSIS — M76.829 POSTERIOR TIBIAL TENDON DYSFUNCTION: ICD-10-CM

## 2025-07-31 NOTE — PROGRESS NOTES
Physical Therapy    Discharge Summary    Name: Mone Shah  MRN: [id number]  : 1975  Date: 25    Discharge Summary: PT    Discharge Information: Date of discharge 2025    Rehab Discharge Reason: Other Patient is being formally discharged from outpatient physical therapy services due to non-attendance, with no treatment or communication in over 30 consecutive days since their last scheduled visit. Progress toward established goals cannot be reassessed or documented due to the absence from care.

## (undated) DEVICE — SUTURE VCRL SZ 4-0 L27IN ABSRB UD L60MM KS STR REV CUT NDL J662H

## (undated) DEVICE — SUTURE VCRL SZ 3-0 L36IN ABSRB VLT CT L40MM 1/2 CIR J356H

## (undated) DEVICE — SUTURE VCRL SZ 1 L36IN ABSRB UD L36MM CT-1 1/2 CIR J947H

## (undated) DEVICE — SUTURE VCRL SZ 0 L36IN ABSRB VLT L36MM CT-1 1/2 CIR J346H

## (undated) DEVICE — DRESSING TELFA STRL 3X6

## (undated) DEVICE — STAPLER SKIN SQ 30 ABSRB STPL DISP INSORB

## (undated) DEVICE — ASTOUND STANDARD SURGICAL GOWN, XL: Brand: CONVERTORS

## (undated) DEVICE — STERILE NEOPRENE POWDER-FREE SURGICAL GLOVES WITH NITRILE COATING: Brand: PROTEXIS

## (undated) DEVICE — SYRINGE BULB EAR ULCER LF ST 1 OZ MEDICHOICE